# Patient Record
Sex: MALE | Race: WHITE | NOT HISPANIC OR LATINO | Employment: OTHER | URBAN - METROPOLITAN AREA
[De-identification: names, ages, dates, MRNs, and addresses within clinical notes are randomized per-mention and may not be internally consistent; named-entity substitution may affect disease eponyms.]

---

## 2019-05-16 ENCOUNTER — NEW PATIENT COMPREHENSIVE (OUTPATIENT)
Dept: URBAN - METROPOLITAN AREA CLINIC 35 | Facility: CLINIC | Age: 74
End: 2019-05-16

## 2019-05-16 DIAGNOSIS — H43.813: ICD-10-CM

## 2019-05-16 DIAGNOSIS — H52.01: ICD-10-CM

## 2019-05-16 DIAGNOSIS — H40.023: ICD-10-CM

## 2019-05-16 PROCEDURE — 92004 COMPRE OPH EXAM NEW PT 1/>: CPT

## 2019-05-16 PROCEDURE — 92015 DETERMINE REFRACTIVE STATE: CPT

## 2019-05-16 PROCEDURE — 92133 CPTRZD OPH DX IMG PST SGM ON: CPT

## 2019-05-16 PROCEDURE — 92134 CPTRZ OPH DX IMG PST SGM RTA: CPT

## 2019-05-16 ASSESSMENT — VISUAL ACUITY
OS_SC: 20/25
OD_BAT: 20/70
OD_SC: 20/40-2
OD_CC: J1
OS_CC: J1
OD_PH: 20/30-2
OS_BAT: 20/70

## 2019-05-16 ASSESSMENT — TONOMETRY
OS_IOP_MMHG: 16
OD_IOP_MMHG: 16

## 2019-09-09 ENCOUNTER — ESTABLISHED PATIENT (OUTPATIENT)
Dept: URBAN - METROPOLITAN AREA CLINIC 35 | Facility: CLINIC | Age: 74
End: 2019-09-09

## 2019-09-09 DIAGNOSIS — H40.023: ICD-10-CM

## 2019-09-09 PROCEDURE — 92083 EXTENDED VISUAL FIELD XM: CPT

## 2019-09-09 PROCEDURE — 92012 INTRM OPH EXAM EST PATIENT: CPT

## 2019-09-09 ASSESSMENT — TONOMETRY
OD_IOP_MMHG: 17
OS_IOP_MMHG: 17

## 2019-09-09 ASSESSMENT — VISUAL ACUITY
OS_SC: 20/25+1
OD_SC: 20/50-2

## 2020-10-29 NOTE — PATIENT DISCUSSION
CYSTIC AREAS - BUT NO DEF CNV - TO RE-EVAL IN 2 MONTHS TO BE SURE NOT PROGRESSING AND IS NOT ATYPICAL CNV.

## 2020-12-14 ENCOUNTER — ESTABLISHED COMPREHENSIVE EXAM (OUTPATIENT)
Dept: URBAN - METROPOLITAN AREA CLINIC 35 | Facility: CLINIC | Age: 75
End: 2020-12-14

## 2020-12-14 DIAGNOSIS — H52.01: ICD-10-CM

## 2020-12-14 DIAGNOSIS — H52.12: ICD-10-CM

## 2020-12-14 DIAGNOSIS — H52.4: ICD-10-CM

## 2020-12-14 DIAGNOSIS — H40.023: ICD-10-CM

## 2020-12-14 PROCEDURE — 92014 COMPRE OPH EXAM EST PT 1/>: CPT

## 2020-12-14 PROCEDURE — 92015 DETERMINE REFRACTIVE STATE: CPT

## 2020-12-14 PROCEDURE — 92250 FUNDUS PHOTOGRAPHY W/I&R: CPT

## 2020-12-14 ASSESSMENT — VISUAL ACUITY
OD_CC: J2
OS_CC: J1
OS_SC: 20/25-2
OD_SC: 20/40-2

## 2020-12-14 ASSESSMENT — KERATOMETRY
OS_K2POWER_DIOPTERS: 42
OD_K1POWER_DIOPTERS: 41.75
OD_K2POWER_DIOPTERS: 41
OS_K1POWER_DIOPTERS: 40.75

## 2020-12-14 ASSESSMENT — TONOMETRY
OS_IOP_MMHG: 16
OD_IOP_MMHG: 16

## 2020-12-29 NOTE — PATIENT DISCUSSION
NO PROGRESSION ON IMAGING 12 29 20 - CYSTIC AREAS OD - BUT NO DEF CNV - TO FOLLOW - PT TO MONITOR VA AND TO CHECK IN 6 MONTHS.

## 2021-02-09 ASSESSMENT — KERATOMETRY
OS_K2POWER_DIOPTERS: 42
OS_K1POWER_DIOPTERS: 40.75
OD_K2POWER_DIOPTERS: 41
OD_K1POWER_DIOPTERS: 41.75

## 2021-02-23 ENCOUNTER — TECH ONLY (OUTPATIENT)
Dept: URBAN - METROPOLITAN AREA CLINIC 35 | Facility: CLINIC | Age: 76
End: 2021-02-23

## 2021-02-23 DIAGNOSIS — H40.023: ICD-10-CM

## 2021-02-23 PROCEDURE — 92083 EXTENDED VISUAL FIELD XM: CPT

## 2021-02-23 PROCEDURE — 99211T TECH SERVICE

## 2021-07-08 NOTE — PATIENT DISCUSSION
MILD SRF - NO PROGRESSION ON FA - NO DEF CNV - TO CONTINUE TO FOLLOW OFF MEDS - PT TO COME IN IF CHANGE VA OR AMSLER.

## 2021-12-14 ASSESSMENT — KERATOMETRY
OS_K2POWER_DIOPTERS: 42
OS_K1POWER_DIOPTERS: 40.75
OD_K1POWER_DIOPTERS: 41.75
OD_K2POWER_DIOPTERS: 41

## 2021-12-15 ENCOUNTER — COMPREHENSIVE EXAM (OUTPATIENT)
Dept: URBAN - METROPOLITAN AREA CLINIC 35 | Facility: CLINIC | Age: 76
End: 2021-12-15

## 2021-12-15 DIAGNOSIS — H43.813: ICD-10-CM

## 2021-12-15 DIAGNOSIS — H52.12: ICD-10-CM

## 2021-12-15 DIAGNOSIS — H25.11: ICD-10-CM

## 2021-12-15 DIAGNOSIS — H40.023: ICD-10-CM

## 2021-12-15 PROCEDURE — 92014 COMPRE OPH EXAM EST PT 1/>: CPT

## 2021-12-15 PROCEDURE — 92133 CPTRZD OPH DX IMG PST SGM ON: CPT

## 2021-12-15 PROCEDURE — 92134 CPTRZ OPH DX IMG PST SGM RTA: CPT

## 2021-12-15 PROCEDURE — 92015 DETERMINE REFRACTIVE STATE: CPT

## 2021-12-15 ASSESSMENT — TONOMETRY
OD_IOP_MMHG: 15
OS_IOP_MMHG: 15

## 2021-12-15 ASSESSMENT — VISUAL ACUITY
OS_CC: J2 -
OD_CC: 20/25-1
OS_CC: 20/25
OD_CC: J2

## 2022-01-10 NOTE — PATIENT DISCUSSION
MILD SRF - NO PROGRESSION ON IMAGING - NO DEF CNV - TO CONTINUE TO FOLLOW OFF MEDS - PT TO COME IN IF CHANGE VA OR AMSLER.

## 2022-03-10 ASSESSMENT — KERATOMETRY
OS_K2POWER_DIOPTERS: 42
OD_K2POWER_DIOPTERS: 41
OD_K1POWER_DIOPTERS: 41.75
OS_K1POWER_DIOPTERS: 40.75

## 2022-03-11 ENCOUNTER — TECH ONLY (OUTPATIENT)
Dept: URBAN - METROPOLITAN AREA CLINIC 35 | Facility: CLINIC | Age: 77
End: 2022-03-11

## 2022-03-11 DIAGNOSIS — H40.023: ICD-10-CM

## 2022-03-11 PROCEDURE — 92083 EXTENDED VISUAL FIELD XM: CPT

## 2022-03-11 PROCEDURE — 99211T TECH SERVICE

## 2023-01-17 NOTE — PATIENT DISCUSSION
1 17 23 NO GLASSES - PT NO NOTICING CHANGE VA - CTM.
1 17 23  SRF/RPE CHANGES REMAIN W/OUT PROGRESSION - CTM CLOSELY.
ARTIFICIAL TEARS to affected eye(s) as needed.
BMI Within normal limits, continue current management.
Does NOT APPEAR VISUALLY SIGNIFICANT.
ERM does NOT APPEAR VISUALLY SIGNIFICANT.
MILD SRF - NO PROGRESSION ON IMAGING - NO DEF CNV - TO CONTINUE TO FOLLOW OFF MEDS - PT TO COME IN IF CHANGE VA OR AMSLER.
MONITOR response to TREATMENT.
Macula Risk DNA Test discussed and patient declines testing.
My findings and recommendations are based on patient's symptoms, eye exam, diagnostic testing, and records.
NO PROGRESSION ON IMAGING 12 29 20 - CYSTIC AREAS OD - BUT NO DEF CNV - TO FOLLOW - PT TO MONITOR VA AND TO CHECK IN 6 MONTHS.
NONSMOKER.
No retinal tears or retinal detachment seen on clinical exam today. Reviewed the signs and symptoms of retinal tear/retinal detachment and the importance of calling for prompt evaluation should there be increasing floaters, new flashing lights, or decreasing peripheral vision in either eye at any time. Observation recommended.
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
Recommend OBSERVATION and continued MONITORING for progression.
Recommended OBSERVATION and MONITORING for change.
Recommended OBSERVATION and continued MONITORING for progression.
Recommended weight and BMI control through healthy diet and exercise, green leafy veggies, UV protection, and not smoking. Reviewed the benefits of AREDS II VITAMINS VERUS GENOTYPE directed vitamin therapy, and recommended following one or the other to try and prevent the progression of the disease. Reviewed the importance of daily monitoring of the vision in each eye independently, along with the use of the Amsler grid daily and instructed patient to call and return immediately for any new changes in their vision or on the Amsler grid. Patient instructed on the importance of regular follow up and monitoring for the early detection of conversion to wet AMD as early detection results in early treatment and better outcomes.
The patient is at high risk for a choroidal neovascular membrane. NO CNV SEEN TODAY. 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
Well positioned.
no

## 2024-11-20 ENCOUNTER — EMERGENCY VISIT (OUTPATIENT)
Dept: URBAN - METROPOLITAN AREA CLINIC 38 | Facility: CLINIC | Age: 79
End: 2024-11-20

## 2024-11-20 DIAGNOSIS — H25.811: ICD-10-CM

## 2024-11-20 DIAGNOSIS — Z96.1: ICD-10-CM

## 2024-11-20 DIAGNOSIS — H27.112: ICD-10-CM

## 2024-11-20 DIAGNOSIS — H43.813: ICD-10-CM

## 2024-11-20 DIAGNOSIS — H40.023: ICD-10-CM

## 2024-11-20 PROCEDURE — 99214 OFFICE O/P EST MOD 30 MIN: CPT
